# Patient Record
Sex: MALE | Race: BLACK OR AFRICAN AMERICAN | NOT HISPANIC OR LATINO | URBAN - METROPOLITAN AREA
[De-identification: names, ages, dates, MRNs, and addresses within clinical notes are randomized per-mention and may not be internally consistent; named-entity substitution may affect disease eponyms.]

---

## 2019-10-23 ENCOUNTER — INPATIENT (INPATIENT)
Facility: HOSPITAL | Age: 29
LOS: 1 days | Discharge: HOME | DRG: 203 | End: 2019-10-25
Attending: INTERNAL MEDICINE | Admitting: INTERNAL MEDICINE
Payer: MEDICAID

## 2019-10-23 VITALS
DIASTOLIC BLOOD PRESSURE: 72 MMHG | WEIGHT: 240.08 LBS | TEMPERATURE: 98 F | SYSTOLIC BLOOD PRESSURE: 118 MMHG | RESPIRATION RATE: 17 BRPM | OXYGEN SATURATION: 98 % | HEART RATE: 100 BPM | HEIGHT: 68 IN

## 2019-10-23 DIAGNOSIS — R55 SYNCOPE AND COLLAPSE: ICD-10-CM

## 2019-10-23 DIAGNOSIS — Z29.9 ENCOUNTER FOR PROPHYLACTIC MEASURES, UNSPECIFIED: ICD-10-CM

## 2019-10-23 DIAGNOSIS — J45.901 UNSPECIFIED ASTHMA WITH (ACUTE) EXACERBATION: ICD-10-CM

## 2019-10-23 LAB
ALBUMIN SERPL ELPH-MCNC: 4 G/DL — SIGNIFICANT CHANGE UP (ref 3.5–5)
ALP SERPL-CCNC: 80 U/L — SIGNIFICANT CHANGE UP (ref 40–120)
ALT FLD-CCNC: 53 U/L DA — SIGNIFICANT CHANGE UP (ref 10–60)
ANION GAP SERPL CALC-SCNC: 6 MMOL/L — SIGNIFICANT CHANGE UP (ref 5–17)
AST SERPL-CCNC: 25 U/L — SIGNIFICANT CHANGE UP (ref 10–40)
BASE EXCESS BLDV CALC-SCNC: -1.3 MMOL/L — SIGNIFICANT CHANGE UP (ref -2–2)
BASOPHILS # BLD AUTO: 0.06 K/UL — SIGNIFICANT CHANGE UP (ref 0–0.2)
BASOPHILS NFR BLD AUTO: 0.6 % — SIGNIFICANT CHANGE UP (ref 0–2)
BILIRUB SERPL-MCNC: 0.5 MG/DL — SIGNIFICANT CHANGE UP (ref 0.2–1.2)
BLOOD GAS COMMENTS, VENOUS: SIGNIFICANT CHANGE UP
BUN SERPL-MCNC: 7 MG/DL — SIGNIFICANT CHANGE UP (ref 7–18)
CALCIUM SERPL-MCNC: 8.5 MG/DL — SIGNIFICANT CHANGE UP (ref 8.4–10.5)
CHLORIDE SERPL-SCNC: 106 MMOL/L — SIGNIFICANT CHANGE UP (ref 96–108)
CK MB BLD-MCNC: 1.4 % — SIGNIFICANT CHANGE UP (ref 0–3.5)
CK MB CFR SERPL CALC: 4.5 NG/ML — HIGH (ref 0–3.6)
CK SERPL-CCNC: 320 U/L — HIGH (ref 35–232)
CO2 SERPL-SCNC: 24 MMOL/L — SIGNIFICANT CHANGE UP (ref 22–31)
CREAT SERPL-MCNC: 1.09 MG/DL — SIGNIFICANT CHANGE UP (ref 0.5–1.3)
EOSINOPHIL # BLD AUTO: 0.31 K/UL — SIGNIFICANT CHANGE UP (ref 0–0.5)
EOSINOPHIL NFR BLD AUTO: 3.3 % — SIGNIFICANT CHANGE UP (ref 0–6)
FLU A RESULT: SIGNIFICANT CHANGE UP
FLU A RESULT: SIGNIFICANT CHANGE UP
FLUAV AG NPH QL: SIGNIFICANT CHANGE UP
FLUBV AG NPH QL: SIGNIFICANT CHANGE UP
GLUCOSE SERPL-MCNC: 161 MG/DL — HIGH (ref 70–99)
HCO3 BLDV-SCNC: 24 MMOL/L — SIGNIFICANT CHANGE UP (ref 21–29)
HCT VFR BLD CALC: 43.1 % — SIGNIFICANT CHANGE UP (ref 39–50)
HGB BLD-MCNC: 14.3 G/DL — SIGNIFICANT CHANGE UP (ref 13–17)
HOROWITZ INDEX BLDV+IHG-RTO: 21 — SIGNIFICANT CHANGE UP
IMM GRANULOCYTES NFR BLD AUTO: 0.4 % — SIGNIFICANT CHANGE UP (ref 0–1.5)
LYMPHOCYTES # BLD AUTO: 1.33 K/UL — SIGNIFICANT CHANGE UP (ref 1–3.3)
LYMPHOCYTES # BLD AUTO: 14.1 % — SIGNIFICANT CHANGE UP (ref 13–44)
MCHC RBC-ENTMCNC: 30.3 PG — SIGNIFICANT CHANGE UP (ref 27–34)
MCHC RBC-ENTMCNC: 33.2 GM/DL — SIGNIFICANT CHANGE UP (ref 32–36)
MCV RBC AUTO: 91.3 FL — SIGNIFICANT CHANGE UP (ref 80–100)
MONOCYTES # BLD AUTO: 0.6 K/UL — SIGNIFICANT CHANGE UP (ref 0–0.9)
MONOCYTES NFR BLD AUTO: 6.4 % — SIGNIFICANT CHANGE UP (ref 2–14)
NEUTROPHILS # BLD AUTO: 7.1 K/UL — SIGNIFICANT CHANGE UP (ref 1.8–7.4)
NEUTROPHILS NFR BLD AUTO: 75.2 % — SIGNIFICANT CHANGE UP (ref 43–77)
NRBC # BLD: 0 /100 WBCS — SIGNIFICANT CHANGE UP (ref 0–0)
PCO2 BLDV: 45 MMHG — SIGNIFICANT CHANGE UP (ref 35–50)
PH BLDV: 7.35 — SIGNIFICANT CHANGE UP (ref 7.35–7.45)
PLATELET # BLD AUTO: 345 K/UL — SIGNIFICANT CHANGE UP (ref 150–400)
PO2 BLDV: 46 MMHG — HIGH (ref 25–45)
POTASSIUM SERPL-MCNC: 4.1 MMOL/L — SIGNIFICANT CHANGE UP (ref 3.5–5.3)
POTASSIUM SERPL-SCNC: 4.1 MMOL/L — SIGNIFICANT CHANGE UP (ref 3.5–5.3)
PROT SERPL-MCNC: 8.1 G/DL — SIGNIFICANT CHANGE UP (ref 6–8.3)
RBC # BLD: 4.72 M/UL — SIGNIFICANT CHANGE UP (ref 4.2–5.8)
RBC # FLD: 13.2 % — SIGNIFICANT CHANGE UP (ref 10.3–14.5)
RSV RESULT: SIGNIFICANT CHANGE UP
RSV RNA RESP QL NAA+PROBE: SIGNIFICANT CHANGE UP
SAO2 % BLDV: 80 % — SIGNIFICANT CHANGE UP (ref 67–88)
SODIUM SERPL-SCNC: 136 MMOL/L — SIGNIFICANT CHANGE UP (ref 135–145)
TROPONIN I SERPL-MCNC: <0.015 NG/ML — SIGNIFICANT CHANGE UP (ref 0–0.04)
WBC # BLD: 9.44 K/UL — SIGNIFICANT CHANGE UP (ref 3.8–10.5)
WBC # FLD AUTO: 9.44 K/UL — SIGNIFICANT CHANGE UP (ref 3.8–10.5)

## 2019-10-23 PROCEDURE — 99285 EMERGENCY DEPT VISIT HI MDM: CPT

## 2019-10-23 PROCEDURE — 71046 X-RAY EXAM CHEST 2 VIEWS: CPT | Mod: 26

## 2019-10-23 RX ORDER — IPRATROPIUM/ALBUTEROL SULFATE 18-103MCG
3 AEROSOL WITH ADAPTER (GRAM) INHALATION ONCE
Refills: 0 | Status: COMPLETED | OUTPATIENT
Start: 2019-10-23 | End: 2019-10-23

## 2019-10-23 RX ORDER — OXYMETAZOLINE HYDROCHLORIDE 0.5 MG/ML
1 SPRAY NASAL
Refills: 0 | Status: COMPLETED | OUTPATIENT
Start: 2019-10-23 | End: 2019-10-25

## 2019-10-23 RX ORDER — ALBUTEROL 90 UG/1
2.5 AEROSOL, METERED ORAL ONCE
Refills: 0 | Status: COMPLETED | OUTPATIENT
Start: 2019-10-23 | End: 2019-10-23

## 2019-10-23 RX ORDER — MONTELUKAST 4 MG/1
10 TABLET, CHEWABLE ORAL DAILY
Refills: 0 | Status: DISCONTINUED | OUTPATIENT
Start: 2019-10-23 | End: 2019-10-25

## 2019-10-23 RX ORDER — BUDESONIDE AND FORMOTEROL FUMARATE DIHYDRATE 160; 4.5 UG/1; UG/1
2 AEROSOL RESPIRATORY (INHALATION)
Refills: 0 | Status: DISCONTINUED | OUTPATIENT
Start: 2019-10-23 | End: 2019-10-25

## 2019-10-23 RX ORDER — LORATADINE 10 MG/1
10 TABLET ORAL DAILY
Refills: 0 | Status: DISCONTINUED | OUTPATIENT
Start: 2019-10-23 | End: 2019-10-25

## 2019-10-23 RX ORDER — IPRATROPIUM/ALBUTEROL SULFATE 18-103MCG
3 AEROSOL WITH ADAPTER (GRAM) INHALATION EVERY 6 HOURS
Refills: 0 | Status: DISCONTINUED | OUTPATIENT
Start: 2019-10-23 | End: 2019-10-25

## 2019-10-23 RX ADMIN — BUDESONIDE AND FORMOTEROL FUMARATE DIHYDRATE 2 PUFF(S): 160; 4.5 AEROSOL RESPIRATORY (INHALATION) at 23:23

## 2019-10-23 RX ADMIN — Medication 3 MILLILITER(S): at 08:32

## 2019-10-23 RX ADMIN — LORATADINE 10 MILLIGRAM(S): 10 TABLET ORAL at 11:18

## 2019-10-23 RX ADMIN — Medication 40 MILLIGRAM(S): at 21:44

## 2019-10-23 RX ADMIN — ALBUTEROL 2.5 MILLIGRAM(S): 90 AEROSOL, METERED ORAL at 12:41

## 2019-10-23 RX ADMIN — Medication 3 MILLILITER(S): at 14:50

## 2019-10-23 RX ADMIN — OXYMETAZOLINE HYDROCHLORIDE 1 SPRAY(S): 0.5 SPRAY NASAL at 19:27

## 2019-10-23 RX ADMIN — Medication 3 MILLILITER(S): at 20:25

## 2019-10-23 RX ADMIN — Medication 40 MILLIGRAM(S): at 08:32

## 2019-10-23 RX ADMIN — Medication 40 MILLIGRAM(S): at 13:53

## 2019-10-23 RX ADMIN — MONTELUKAST 10 MILLIGRAM(S): 4 TABLET, CHEWABLE ORAL at 11:18

## 2019-10-23 NOTE — H&P ADULT - NSHPPHYSICALEXAM_GEN_ALL_CORE
Vital Signs Last 24 Hrs  T(C): 36.9 (23 Oct 2019 11:18), Max: 36.9 (23 Oct 2019 11:18)  T(F): 98.4 (23 Oct 2019 11:18), Max: 98.4 (23 Oct 2019 11:18)  HR: 111 (23 Oct 2019 11:18) (100 - 111)  BP: 153/96 (23 Oct 2019 11:18) (118/72 - 153/96)  RR: 20 (23 Oct 2019 11:18) (16 - 20)  SpO2: 97% (23 Oct 2019 11:18) (97% - 98%)      PHYSICAL EXAM:  GENERAL: NAD  HEENT: Normocephalic;  conjunctivae and sclerae clear; moist mucous membranes;   Nasal congestion   NECK : supple  CHEST/LUNG: Clear to auscultation bilaterally with good air entry , wheeze present   HEART: S1 S2  regular; no murmurs, gallops or rubs  ABDOMEN: Soft, Nontender, Nondistended; Bowel sounds present  EXTREMITIES: no cyanosis; no edema; no calf tenderness  SKIN: warm and dry; no rash  NERVOUS SYSTEM:  Awake and alert; Oriented  to place, person and time ; no new deficits

## 2019-10-23 NOTE — H&P ADULT - HISTORY OF PRESENT ILLNESS
29 yr old male with hx of asthma poorly controlled presents to ed c/o worseing asthma with syncope pta. pt states poorly control many yrs been just using albuterol only. today went to work and felt dizzy, nausea and sub fever then passed out for sec.  no head trauma. no palpitations, no leg swelling, no vomiting.  didn't receive flu shot.  ems gave 2 combi and mag 2mg IV. no fam hx of arrhythmia, no cp, no headache, no visual changes, no incontinence, 29 yr old male with hx of asthma, poorly controlled presents to ed c/o worsening SOB from last 2 days, requiring use os Albuterol inhaler every 10 mins associated with nasal congestion, cough with yellow sputum production, wheeze, denies fever. He has h/o Asthma from last 5 years, doesnot follow up with PCP and uses Albuterol inhaler. He mentions that his Asthma symptoms were never controlled especially from last 1 year and acutely worsened in last 2 days.     HE was at work( works as a ) when he suddenly felt hot, lightheaded and fell but denies hitting head or any injury, regained consciousness in few seconds, felt back to baseline immediately. Denies palpitation, CP.

## 2019-10-23 NOTE — ED PROVIDER NOTE - PROGRESS NOTE DETAILS
[place]: improve but still having diffuse wheezing.  abc intact.  no resp distress  admit to med for asthma exacerbation will require nebs and asthma maintenancec

## 2019-10-23 NOTE — H&P ADULT - NSHPREVIEWOFSYSTEMS_GEN_ALL_CORE
REVIEW OF SYSTEMS:  CONSTITUTIONAL: No fever,   EYES: no acute visual disturbances  NECK: No pain or stiffness  RESPIRATORY: as above   CARDIOVASCULAR: No chest pain, no palpitations  GASTROINTESTINAL: No pain. No nausea or vomiting; No diarrhea   NEUROLOGICAL: No headache or numbness, no tremors  MUSCULOSKELETAL: No joint pain, no muscle pain  GENITOURINARY: no dysuria, no frequency, no hesitancy  PSYCHIATRY: no depression , no anxiety  ALL OTHER  ROS negative

## 2019-10-23 NOTE — H&P ADULT - PROBLEM SELECTOR PLAN 1
p/w SOb   - RR: 17, saturating 98% on RA   -FLU, RSV: neg   - ED coursE: Albuterol, Prednisone 40 x1   - Duo neb,   -Solu medrol 40 q8 , switch to Po tomorrow, if improving   - Patient will benefit from adding Symbicort to home med on discharge   -Claritin, Montelukast   - Afrin for 2 days

## 2019-10-23 NOTE — ED PROVIDER NOTE - OBJECTIVE STATEMENT
29 yr old male with hx of asthma poorly controlled presents to ed c/o worseing asthma with syncope pta. pt states poorly control many yrs been just using albuterol only. today went to work and felt dizzy, nausea and sub fever then passed out for sec.  no head trauma. no palpitations, no leg swelling, no vomiting.  didn't receive flu shot.  ems gave 2 combi and mag 2mg IV. no fam hx of arrhythmia, no cp, no headache, no visual changes, no incontinence,

## 2019-10-23 NOTE — ED PROVIDER NOTE - AXIS
Patient will come to [facility] office to : prescription.   Patient was advised of location and hours: Yes.   Patient was advised to bring photo ID: Yes.   Patient elects another party to  item: no    Pt will  script Thursday around 4:00  PT IS AWARE OF NEW 72 HOUR RULE.        Normal

## 2019-10-23 NOTE — H&P ADULT - ATTENDING COMMENTS
Patient seen and examined, History, PE , detail management d/w Dr Francisco-PGY 2.  Also initially d/w ER MD in detail  To check Peak flow periodically and prepare for early discharge, needs close out patient management.

## 2019-10-24 LAB
24R-OH-CALCIDIOL SERPL-MCNC: 4.4 NG/ML — LOW (ref 30–80)
ALBUMIN SERPL ELPH-MCNC: 3.7 G/DL — SIGNIFICANT CHANGE UP (ref 3.5–5)
ALP SERPL-CCNC: 84 U/L — SIGNIFICANT CHANGE UP (ref 40–120)
ALT FLD-CCNC: 53 U/L DA — SIGNIFICANT CHANGE UP (ref 10–60)
ANION GAP SERPL CALC-SCNC: 8 MMOL/L — SIGNIFICANT CHANGE UP (ref 5–17)
AST SERPL-CCNC: 17 U/L — SIGNIFICANT CHANGE UP (ref 10–40)
BASOPHILS # BLD AUTO: 0 K/UL — SIGNIFICANT CHANGE UP (ref 0–0.2)
BASOPHILS NFR BLD AUTO: 0 % — SIGNIFICANT CHANGE UP (ref 0–2)
BILIRUB SERPL-MCNC: 0.2 MG/DL — SIGNIFICANT CHANGE UP (ref 0.2–1.2)
BUN SERPL-MCNC: 14 MG/DL — SIGNIFICANT CHANGE UP (ref 7–18)
CALCIUM SERPL-MCNC: 8.9 MG/DL — SIGNIFICANT CHANGE UP (ref 8.4–10.5)
CHLORIDE SERPL-SCNC: 104 MMOL/L — SIGNIFICANT CHANGE UP (ref 96–108)
CHOLEST SERPL-MCNC: 181 MG/DL — SIGNIFICANT CHANGE UP (ref 10–199)
CO2 SERPL-SCNC: 27 MMOL/L — SIGNIFICANT CHANGE UP (ref 22–31)
CREAT SERPL-MCNC: 1 MG/DL — SIGNIFICANT CHANGE UP (ref 0.5–1.3)
EOSINOPHIL # BLD AUTO: 0 K/UL — SIGNIFICANT CHANGE UP (ref 0–0.5)
EOSINOPHIL NFR BLD AUTO: 0 % — SIGNIFICANT CHANGE UP (ref 0–6)
FOLATE SERPL-MCNC: 7.5 NG/ML — SIGNIFICANT CHANGE UP
GLUCOSE SERPL-MCNC: 127 MG/DL — HIGH (ref 70–99)
HBA1C BLD-MCNC: 5.9 % — HIGH (ref 4–5.6)
HCT VFR BLD CALC: 45.6 % — SIGNIFICANT CHANGE UP (ref 39–50)
HDLC SERPL-MCNC: 35 MG/DL — LOW
HGB BLD-MCNC: 15.1 G/DL — SIGNIFICANT CHANGE UP (ref 13–17)
LIPID PNL WITH DIRECT LDL SERPL: 131 MG/DL — SIGNIFICANT CHANGE UP
LYMPHOCYTES # BLD AUTO: 1.33 K/UL — SIGNIFICANT CHANGE UP (ref 1–3.3)
LYMPHOCYTES # BLD AUTO: 7 % — LOW (ref 13–44)
MAGNESIUM SERPL-MCNC: 2.8 MG/DL — HIGH (ref 1.6–2.6)
MCHC RBC-ENTMCNC: 30.2 PG — SIGNIFICANT CHANGE UP (ref 27–34)
MCHC RBC-ENTMCNC: 33.1 GM/DL — SIGNIFICANT CHANGE UP (ref 32–36)
MCV RBC AUTO: 91.2 FL — SIGNIFICANT CHANGE UP (ref 80–100)
MONOCYTES # BLD AUTO: 1.52 K/UL — HIGH (ref 0–0.9)
MONOCYTES NFR BLD AUTO: 8 % — SIGNIFICANT CHANGE UP (ref 2–14)
NEUTROPHILS # BLD AUTO: 16.17 K/UL — HIGH (ref 1.8–7.4)
NEUTROPHILS NFR BLD AUTO: 83 % — HIGH (ref 43–77)
PHOSPHATE SERPL-MCNC: 4.7 MG/DL — HIGH (ref 2.5–4.5)
PLATELET # BLD AUTO: 442 K/UL — HIGH (ref 150–400)
POTASSIUM SERPL-MCNC: 4.3 MMOL/L — SIGNIFICANT CHANGE UP (ref 3.5–5.3)
POTASSIUM SERPL-SCNC: 4.3 MMOL/L — SIGNIFICANT CHANGE UP (ref 3.5–5.3)
PROT SERPL-MCNC: 8.3 G/DL — SIGNIFICANT CHANGE UP (ref 6–8.3)
RBC # BLD: 5 M/UL — SIGNIFICANT CHANGE UP (ref 4.2–5.8)
RBC # FLD: 13.7 % — SIGNIFICANT CHANGE UP (ref 10.3–14.5)
SODIUM SERPL-SCNC: 139 MMOL/L — SIGNIFICANT CHANGE UP (ref 135–145)
TOTAL CHOLESTEROL/HDL RATIO MEASUREMENT: 5.2 RATIO — SIGNIFICANT CHANGE UP (ref 3.4–9.6)
TRIGL SERPL-MCNC: 74 MG/DL — SIGNIFICANT CHANGE UP (ref 10–149)
TSH SERPL-MCNC: 0.41 UU/ML — SIGNIFICANT CHANGE UP (ref 0.34–4.82)
VIT B12 SERPL-MCNC: 846 PG/ML — SIGNIFICANT CHANGE UP (ref 232–1245)
WBC # BLD: 19.02 K/UL — HIGH (ref 3.8–10.5)
WBC # FLD AUTO: 19.02 K/UL — HIGH (ref 3.8–10.5)

## 2019-10-24 PROCEDURE — 93306 TTE W/DOPPLER COMPLETE: CPT | Mod: 26

## 2019-10-24 RX ORDER — ALBUTEROL 90 UG/1
2 AEROSOL, METERED ORAL
Qty: 1 | Refills: 0
Start: 2019-10-24 | End: 2019-11-22

## 2019-10-24 RX ORDER — ERGOCALCIFEROL 1.25 MG/1
50000 CAPSULE ORAL
Refills: 0 | Status: DISCONTINUED | OUTPATIENT
Start: 2019-10-24 | End: 2019-10-25

## 2019-10-24 RX ORDER — LORATADINE 10 MG/1
1 TABLET ORAL
Qty: 0 | Refills: 0 | DISCHARGE
Start: 2019-10-24

## 2019-10-24 RX ORDER — ALBUTEROL 90 UG/1
0 AEROSOL, METERED ORAL
Qty: 0 | Refills: 0 | DISCHARGE

## 2019-10-24 RX ORDER — MONTELUKAST 4 MG/1
1 TABLET, CHEWABLE ORAL
Qty: 30 | Refills: 0
Start: 2019-10-24 | End: 2019-11-22

## 2019-10-24 RX ORDER — BUDESONIDE AND FORMOTEROL FUMARATE DIHYDRATE 160; 4.5 UG/1; UG/1
2 AEROSOL RESPIRATORY (INHALATION)
Qty: 1 | Refills: 0
Start: 2019-10-24 | End: 2019-11-22

## 2019-10-24 RX ADMIN — OXYMETAZOLINE HYDROCHLORIDE 1 SPRAY(S): 0.5 SPRAY NASAL at 06:37

## 2019-10-24 RX ADMIN — Medication 3 MILLILITER(S): at 08:45

## 2019-10-24 RX ADMIN — BUDESONIDE AND FORMOTEROL FUMARATE DIHYDRATE 2 PUFF(S): 160; 4.5 AEROSOL RESPIRATORY (INHALATION) at 21:17

## 2019-10-24 RX ADMIN — Medication 40 MILLIGRAM(S): at 06:37

## 2019-10-24 RX ADMIN — Medication 40 MILLIGRAM(S): at 17:45

## 2019-10-24 RX ADMIN — MONTELUKAST 10 MILLIGRAM(S): 4 TABLET, CHEWABLE ORAL at 11:24

## 2019-10-24 RX ADMIN — Medication 3 MILLILITER(S): at 15:29

## 2019-10-24 RX ADMIN — LORATADINE 10 MILLIGRAM(S): 10 TABLET ORAL at 11:25

## 2019-10-24 RX ADMIN — BUDESONIDE AND FORMOTEROL FUMARATE DIHYDRATE 2 PUFF(S): 160; 4.5 AEROSOL RESPIRATORY (INHALATION) at 12:49

## 2019-10-24 RX ADMIN — OXYMETAZOLINE HYDROCHLORIDE 1 SPRAY(S): 0.5 SPRAY NASAL at 17:45

## 2019-10-24 RX ADMIN — Medication 3 MILLILITER(S): at 20:18

## 2019-10-24 NOTE — CONSULT NOTE ADULT - SUBJECTIVE AND OBJECTIVE BOX
CHIEF COMPLAINT:Syncope    HPI:29 yr old male with hx of asthma, poorly controlled presents to ed c/o worsening SOB from last 2 days, requiring use of Albuterol inhaler every 10 mins associated with nasal congestion, cough with yellow sputum production, wheeze, denies fever. He has h/o Asthma from last 5 years, doesnot follow up with PCP and uses Albuterol inhaler. He mentions that his Asthma symptoms were never controlled especially from last 1 year and acutely worsened in last 2 days.     HE was at work( works as a ) when he suddenly felt hot, lightheaded and fell but denies hitting head or any injury, regained consciousness in few seconds, felt back to baseline immediately. Denies palpitation, CP. No prior Hx syncope    PAST MEDICAL & SURGICAL HISTORY:  Asthma  No significant past surgical history      MEDICATIONS  (STANDING):  albuterol/ipratropium for Nebulization 3 milliLiter(s) Nebulizer every 6 hours  budesonide  80 MICROgram(s)/formoterol 4.5 MICROgram(s) Inhaler 2 Puff(s) Inhalation two times a day  loratadine 10 milliGRAM(s) Oral daily  methylPREDNISolone sodium succinate Injectable 40 milliGRAM(s) IV Push once  montelukast 10 milliGRAM(s) Oral daily  oxymetazoline 0.05% Nasal Spray 1 Spray(s) Both Nostrils two times a day        FAMILY HISTORY:  No pertinent family history in first degree relatives  No family history of premature coronary artery disease or sudden cardiac death    SOCIAL HISTORY:  Smoking-Non Smoker  Alcohol-Denies  Ilicit Drug use-Denies    REVIEW OF SYSTEMS:  Constitutional: [ ] fever, [ ]weight loss, [ ]fatigue Activity [ ] Bedbound,[x ] Ambulates [x ] Unassisted[ ] Cane/Walker [ ] Assistence.  Eyes: [ ] visual changes  Respiratory: [x ]shortness of breath;  [ ] cough, [ ]wheezing, [ ]chills, [ ]hemoptysis  Cardiovascular: [ ] chest pain, [ ]palpitations, [ ]dizziness,  [ ]leg swelling[ ]orthopnea [ ]PND  Gastrointestinal: [ ] abdominal pain, [ ]nausea, [ ]vomiting,  [ ]diarrhea,[ ]constipation  Genitourinary: [ ] dysuria, [ ] hematuria  Neurologic: [ ] headaches [ ] tremors[ ] weakness  Skin: [ ] itching, [ ]burning, [ ] rashes  Endocrine: [ ] heat or cold intolerance  Musculoskeletal: [ ] joint pain or swelling; [ ] muscle, back, or extremity pain  Psychiatric: [ ] depression, [ ]anxiety, [ ]mood swings, or [ ]difficulty sleeping  Hematologic: [ ] easy bruising, [ ] bleeding gums       [ x] All others negative	  [ ] Unable to obtain    Vital Signs Last 24 Hrs  T(C): 36.7 (24 Oct 2019 07:40), Max: 36.9 (23 Oct 2019 11:18)  T(F): 98.1 (24 Oct 2019 07:40), Max: 98.4 (23 Oct 2019 11:18)  HR: 97 (24 Oct 2019 07:40) (96 - 111)  BP: 136/68 (24 Oct 2019 07:40) (132/77 - 153/96  RR: 16 (24 Oct 2019 07:40) (16 - 20)  SpO2: 93% (24 Oct 2019 07:40) (93% - 97%)  I&O's Summary      PHYSICAL EXAM:  General: No acute distress BMI-36.5  HEENT: EOMI, PERRL[ ] Icteric  Neck: Supple, No JVD  Lungs: Equal air entry bilaterally; [ ] Rales [ ] Rhonchi [ ] Wheezing  Heart: Regular rate and rhythm;[x ] Murmurs-  2 /6 [x ] Systolic [ ] Diastolic [ ] Radiation,No rubs, or gallops  Abdomen: Nontender, bowel sounds present  Extremities: No clubbing, cyanosis, or edema[ ] Calf tenderness  Nervous system:  Alert & Oriented X3, no focal deficits  Psychiatric: Normal affect  Skin: No rashes or lesions      LABS:  10-24    139  |  104  |  14  ----------------------------<  127<H>  4.3   |  27  |  1.00    Ca    8.9      24 Oct 2019 06:43  Phos  4.7     10-24  Mg     2.8     10-24    TPro  8.3  /  Alb  3.7  /  TBili  0.2  /  DBili  x   /  AST  17  /  ALT  53  /  AlkPhos  84  10-24    Creatinine Trend: 1.00<--, 1.09<--                        15.1   19.02 )-----------( 442      ( 24 Oct 2019 06:43 )             45.6         Lipid Panel: Cholesterol, Serum 181  Direct   HDL Cholesterol, Serum 35  Triglycerides, Serum 74    Cardiac Enzymes: CARDIAC MARKERS ( 23 Oct 2019 14:26 )  <0.015 ng/mL / x     / 320 U/L / x     / 4.5 ng/mL            RADIOLOGY:XR CHEST PA LAT 2V                   Impression:-Clear lungs.      ECG [my interpretation]:Sinus rhythm no ST T wave abnormalities    TELEMETRY:Sinus rhythm no arrhythmias

## 2019-10-24 NOTE — PROGRESS NOTE ADULT - PROBLEM SELECTOR PLAN 1
Pt feels much better now  symptoms have improved  peak flow spirometry was done, maximum was 170   f/u for medications as he is not able to afford

## 2019-10-24 NOTE — CONSULT NOTE ADULT - ATTENDING COMMENTS
Patient was seen and examined,interim events noted,labs and radiology studies reviewed.  Lewis Burnette MD,FACC.  [address] [place] [address].  [id number].  [phone]

## 2019-10-24 NOTE — PROGRESS NOTE ADULT - SUBJECTIVE AND OBJECTIVE BOX
PGY 1 Note discussed with supervising resident and primary attending    Patient is a 29y old  Male who presents with a chief complaint of SOB, syncope (24 Oct 2019 10:58)      INTERVAL HPI/OVERNIGHT EVENTS: offers no new complaints; current symptoms resolving    MEDICATIONS  (STANDING):  albuterol/ipratropium for Nebulization 3 milliLiter(s) Nebulizer every 6 hours  budesonide  80 MICROgram(s)/formoterol 4.5 MICROgram(s) Inhaler 2 Puff(s) Inhalation two times a day  loratadine 10 milliGRAM(s) Oral daily  methylPREDNISolone sodium succinate Injectable 40 milliGRAM(s) IV Push once  montelukast 10 milliGRAM(s) Oral daily  oxymetazoline 0.05% Nasal Spray 1 Spray(s) Both Nostrils two times a day    MEDICATIONS  (PRN):      __________________________________________________  REVIEW OF SYSTEMS:    CONSTITUTIONAL: No fever,   EYES: no acute visual disturbances  NECK: No pain or stiffness  RESPIRATORY: No cough; No shortness of breath  CARDIOVASCULAR: No chest pain, no palpitations  GASTROINTESTINAL: No pain. No nausea or vomiting; No diarrhea   NEUROLOGICAL: No headache or numbness, no tremors  MUSCULOSKELETAL: No joint pain, no muscle pain  GENITOURINARY: no dysuria, no frequency, no hesitancy  PSYCHIATRY: no depression , no anxiety  ALL OTHER  ROS negative        Vital Signs Last 24 Hrs  T(C): 36.7 (24 Oct 2019 11:19), Max: 36.9 (23 Oct 2019 12:24)  T(F): 98.1 (24 Oct 2019 11:19), Max: 98.4 (23 Oct 2019 12:24)  HR: 99 (24 Oct 2019 11:19) (96 - 110)  BP: 149/67 (24 Oct 2019 11:19) (132/77 - 150/68)  BP(mean): --  RR: 16 (24 Oct 2019 11:19) (16 - 18)  SpO2: 94% (24 Oct 2019 11:19) (93% - 97%)    ________________________________________________  PHYSICAL EXAM:  GENERAL: NAD  HEENT: Normocephalic;  conjunctivae and sclerae clear; moist mucous membranes;   NECK : supple  CHEST/LUNG: Clear to auscultation bilaterally with good air entry   HEART: S1 S2  regular; no murmurs, gallops or rubs  ABDOMEN: Soft, Nontender, Nondistended; Bowel sounds present  EXTREMITIES: no cyanosis; no edema; no calf tenderness  SKIN: warm and dry; no rash  NERVOUS SYSTEM:  Awake and alert; Oriented  to place, person and time ; no new deficits    _________________________________________________  LABS:                        15.1   19.02 )-----------( 442      ( 24 Oct 2019 06:43 )             45.6     10-24    139  |  104  |  14  ----------------------------<  127<H>  4.3   |  27  |  1.00    Ca    8.9      24 Oct 2019 06:43  Phos  4.7     10-24  Mg     2.8     10-24    TPro  8.3  /  Alb  3.7  /  TBili  0.2  /  DBili  x   /  AST  17  /  ALT  53  /  AlkPhos  84  10-24        CAPILLARY BLOOD GLUCOSE            RADIOLOGY & ADDITIONAL TESTS:    Imaging Personally Reviewed:  YES/NO    Consultant(s) Notes Reviewed:   YES/ No    Care Discussed with Consultants :     Plan of care was discussed with patient and /or primary care giver; all questions and concerns were addressed and care was aligned with patient's wishes.

## 2019-10-24 NOTE — PROGRESS NOTE ADULT - ASSESSMENT
30 yo M with PMH of Asthma, presented with SOB and Syncope       Admitted for asthma exacerbation 28 yo M with PMH of Asthma, presented with SOB and Syncope       Admitted for asthma exacerbation   Pt is medically stable to be discharged

## 2019-10-25 VITALS
HEART RATE: 96 BPM | RESPIRATION RATE: 16 BRPM | SYSTOLIC BLOOD PRESSURE: 152 MMHG | OXYGEN SATURATION: 92 % | DIASTOLIC BLOOD PRESSURE: 82 MMHG | TEMPERATURE: 98 F

## 2019-10-25 LAB
ANION GAP SERPL CALC-SCNC: 5 MMOL/L — SIGNIFICANT CHANGE UP (ref 5–17)
BUN SERPL-MCNC: 16 MG/DL — SIGNIFICANT CHANGE UP (ref 7–18)
CALCIUM SERPL-MCNC: 8.4 MG/DL — SIGNIFICANT CHANGE UP (ref 8.4–10.5)
CHLORIDE SERPL-SCNC: 106 MMOL/L — SIGNIFICANT CHANGE UP (ref 96–108)
CK SERPL-CCNC: 112 U/L — SIGNIFICANT CHANGE UP (ref 35–232)
CO2 SERPL-SCNC: 25 MMOL/L — SIGNIFICANT CHANGE UP (ref 22–31)
CREAT SERPL-MCNC: 0.88 MG/DL — SIGNIFICANT CHANGE UP (ref 0.5–1.3)
GLUCOSE SERPL-MCNC: 115 MG/DL — HIGH (ref 70–99)
HCT VFR BLD CALC: 44.7 % — SIGNIFICANT CHANGE UP (ref 39–50)
HGB BLD-MCNC: 14.7 G/DL — SIGNIFICANT CHANGE UP (ref 13–17)
MCHC RBC-ENTMCNC: 30 PG — SIGNIFICANT CHANGE UP (ref 27–34)
MCHC RBC-ENTMCNC: 32.9 GM/DL — SIGNIFICANT CHANGE UP (ref 32–36)
MCV RBC AUTO: 91.2 FL — SIGNIFICANT CHANGE UP (ref 80–100)
NRBC # BLD: 0 /100 WBCS — SIGNIFICANT CHANGE UP (ref 0–0)
PLATELET # BLD AUTO: 429 K/UL — HIGH (ref 150–400)
POTASSIUM SERPL-MCNC: 4.2 MMOL/L — SIGNIFICANT CHANGE UP (ref 3.5–5.3)
POTASSIUM SERPL-SCNC: 4.2 MMOL/L — SIGNIFICANT CHANGE UP (ref 3.5–5.3)
RBC # BLD: 4.9 M/UL — SIGNIFICANT CHANGE UP (ref 4.2–5.8)
RBC # FLD: 13.6 % — SIGNIFICANT CHANGE UP (ref 10.3–14.5)
SODIUM SERPL-SCNC: 136 MMOL/L — SIGNIFICANT CHANGE UP (ref 135–145)
WBC # BLD: 20.12 K/UL — HIGH (ref 3.8–10.5)
WBC # FLD AUTO: 20.12 K/UL — HIGH (ref 3.8–10.5)

## 2019-10-25 PROCEDURE — 99222 1ST HOSP IP/OBS MODERATE 55: CPT

## 2019-10-25 RX ORDER — INFLUENZA VIRUS VACCINE 15; 15; 15; 15 UG/.5ML; UG/.5ML; UG/.5ML; UG/.5ML
0.5 SUSPENSION INTRAMUSCULAR ONCE
Refills: 0 | Status: COMPLETED | OUTPATIENT
Start: 2019-10-25 | End: 2019-10-25

## 2019-10-25 RX ADMIN — OXYMETAZOLINE HYDROCHLORIDE 1 SPRAY(S): 0.5 SPRAY NASAL at 05:45

## 2019-10-25 RX ADMIN — BUDESONIDE AND FORMOTEROL FUMARATE DIHYDRATE 2 PUFF(S): 160; 4.5 AEROSOL RESPIRATORY (INHALATION) at 10:55

## 2019-10-25 RX ADMIN — ERGOCALCIFEROL 50000 UNIT(S): 1.25 CAPSULE ORAL at 11:15

## 2019-10-25 RX ADMIN — Medication 3 MILLILITER(S): at 08:53

## 2019-10-25 RX ADMIN — MONTELUKAST 10 MILLIGRAM(S): 4 TABLET, CHEWABLE ORAL at 11:15

## 2019-10-25 RX ADMIN — Medication 40 MILLIGRAM(S): at 05:45

## 2019-10-25 RX ADMIN — LORATADINE 10 MILLIGRAM(S): 10 TABLET ORAL at 11:15

## 2019-10-25 NOTE — DISCHARGE NOTE NURSING/CASE MANAGEMENT/SOCIAL WORK - PATIENT PORTAL LINK FT
You can access the FollowMyHealth Patient Portal offered by [facility] by registering at the following website: [email or web address]. By joining [facility]’s FollowMyHealth portal, you will also be able to view your health information using other applications (apps) compatible with our system.

## 2019-10-25 NOTE — DISCHARGE NOTE PROVIDER - CARE PROVIDER_API CALL
Rc Salas)  Critical Care Medicine; Internal Medicine; Pulmonary Disease  [address] [place] [address]  Phone: [phone]  Fax: [phone]  Follow Up Time:

## 2019-10-25 NOTE — DISCHARGE NOTE PROVIDER - NSDCCPCAREPLAN_GEN_ALL_CORE_FT
PRINCIPAL DISCHARGE DIAGNOSIS  Diagnosis: Asthma exacerbation attacks  Assessment and Plan of Treatment: Continue with your nebulizers as intrusted by your primary care physician. Avoid triggers for asthma. If you're allergic to dust mites, pollens or molds, they can make your asthma symptoms get worse. Cold air, exercise, fumes from chemicals or perfume, tobacco or wood smoke, and weather changes can also make asthma symptoms worse. So can common colds and sinus infections. Vaccinate with influenza vaccine yearly. Follow up with primary care physician one week after discharge.  You were given flu vaccine. Please follow up with Dr brooks as outpatinet in [facility]25 clinic. You are adviced to remove the dog as it might trigger your asthma attacks.      SECONDARY DISCHARGE DIAGNOSES  Diagnosis: Syncope  Assessment and Plan of Treatment: You had syncopal attack. Cardiology workup was done and it was negative. Dr Burnette was consulted. Your symptoms are due to vasovagal as you were having shortness of breath. Please follow up with primary care doctor in a week.

## 2019-10-25 NOTE — DISCHARGE NOTE PROVIDER - HOSPITAL COURSE
29 yr old male with hx of asthma, poorly controlled presents to ed c/o worsening SOB from last 2 days, requiring use os Albuterol inhaler every 10 mins associated with nasal congestion, cough with yellow sputum production, wheeze, denies fever. He has h/o Asthma from last 5 years, doesnot follow up with PCP and uses Albuterol inhaler. He mentions that his Asthma symptoms were never controlled especially from last 1 year and acutely worsened in last 2 days.     He was admitted for asthma exacerbation and syncope. he was treated with solumedrol and symbicort and duonebs. He was found to have no insurance.  was consulted and [facility] were [facility]. Dr Bass was also consulted. He is adviced to follow up with Dr Bass as out patient. Cardiac workup was negative. Cardiology Dr danielle was consulted and     cardiac causes of syncope were ruled out.     Given patient's improved clinical status and current hemodynamic stability, decision was made to discharge the patient.    Patient is stable for discharge per attending and is advised to follow up with PCP as outpatient    Please refer to patient's complete medical chart with documents for a full hospital course, for this is only a brief summary.

## 2019-10-25 NOTE — CONSULT NOTE ADULT - ASSESSMENT
1) Acute asthma exacerbation likely from Viral URI  2) Syncope secondary to above with vagal episode  3) Moderate persistent asthma , poorly controlled at baseline    Clinically stable, no accessory muscle use or increased work of breathing  Asthma education done including proper inhaler technique and compliance with controller medications - Symbicort, montelukast. He currently has no insurance and is in the process of joining a union, Advised to follow up as soon as his insurance is obtained so there is no gap in his controller medications as his insurance may cover a different inhaler. He should keep his dog out of his bedroom and consider a Hepa air filter at night.  Outpatient follow up and PFTs to evaluation and discussion of asthma action plan.  He should receive his flu shot prior to discharge if he has not already obtained it.  Stable for dc from a pulmonary standpoint
28 yo M with PMH of Asthma, presented with SOB and Syncope       Problem/Plan - 1:  ·  Problem: Syncope.  Plan: - Non exertional  -Vitals: stable no orthostatics  -EKG: NSR   -Likely vasovagal  TTE-if Normal LV Function-cardiac status stable for discharge        Problem/Plan - 1:  ·  Problem: Asthma exacerbation attacks.  Plan: p/w SOb   - RR: 17, saturating 98% on RA   -FLU, RSV: neg   - ED coursE: Albuterol, Prednisone 40 x1   - Duo neb,

## 2019-10-25 NOTE — CONSULT NOTE ADULT - SUBJECTIVE AND OBJECTIVE BOX
Source: patient, chart    Reason for Consultation: acute asthma exacerbation    Chief Complaint: SOB x 1 week    HPI:  29 yr old male with hx of asthma, poorly controlled presents to ed c/o worsening SOB from last 2 days, requiring use os Albuterol inhaler every 10 mins associated with nasal congestion, cough with yellow sputum production, wheeze, denies fever. He has h/o Asthma from last 5 years, doesnot follow up with PCP and uses Albuterol inhaler. He mentions that his Asthma symptoms were never controlled especially from last 1 year and acutely worsened in last 2 days.     HE was at work( works as a ) when he suddenly felt hot, lightheaded and fell but denies hitting head or any injury, regained consciousness in few seconds, felt back to baseline immediately. Denies palpitation, CP. (23 Oct 2019 09:23)    The patient's asthma prior to his illness has not been well controlled. He uses his albuterol daily and has frequent nocturnal symptoms. He adamantly denies using any cocaine, tobacco, or vaping products. He has a dog at home. He has no recent ED, urgent care visits, or hospitalizations in the last few years. He does not see a doctor regularly.    MEDICATIONS  (STANDING):  albuterol/ipratropium for Nebulization 3 milliLiter(s) Nebulizer every 6 hours  budesonide  80 MICROgram(s)/formoterol 4.5 MICROgram(s) Inhaler 2 Puff(s) Inhalation two times a day  ergocalciferol 25036 Unit(s) Oral <User Schedule>  loratadine 10 milliGRAM(s) Oral daily  montelukast 10 milliGRAM(s) Oral daily  predniSONE   Tablet 40 milliGRAM(s) Oral daily    MEDICATIONS  (PRN):    Allergies    No Known Allergies    Intolerances    PAST MEDICAL & SURGICAL HISTORY:  Asthma  No significant past surgical history    FAMILY HISTORY:  No pertinent family history in first degree relatives    SOCIAL HISTORY  Smoking History: Denies  Alcohol: Denies  Drugs: Denies  [place]upation[place]  [place]    T(C): 36.6 (10-25-19 @ 07:55), Max: 37.2 (10-24-19 @ 23:51)  HR: 87 (10-25-19 @ 09:17) (87 - 99)  BP: 128/68 (10-25-19 @ 07:55) (122/80 - 149/67)  RR: 16 (10-25-19 @ 07:55) (16 - 18)  SpO2: 96% (10-25-19 @ 09:17) (94% - 96%)    LABS:                        14.7   20.12 )-----------( 429      ( 25 Oct 2019 07:07 )             44.7     10-25    136  |  106  |  16  ----------------------------<  115<H>  4.2   |  25  |  0.88    Ca    8.4      25 Oct 2019 07:07  Phos  4.7     10-24  Mg     2.8     10-24    TPro  8.3  /  Alb  3.7  /  TBili  0.2  /  DBili  x   /  AST  17  /  ALT  53  /  AlkPhos  84  10-24    CARDIAC MARKERS ( 25 Oct 2019 07:07 )  x     / x     / 112 U/L / x     / x      CARDIAC MARKERS ( 23 Oct 2019 14:26 )  <0.015 ng/mL / x     / 320 U/L / x     / 4.5 ng/mL    Microbiology    RADIOLOGY & ADDITIONAL STUDIES: (My Reading)    CXR- portable film. No infiltrates Source: patient, chart    Reason for Consultation: acute asthma exacerbation    Chief Complaint: SOB x 1 week    HPI:  29 yr old male with hx of asthma, poorly controlled presents to ed c/o worsening SOB from last 2 days, requiring use os Albuterol inhaler every 10 mins associated with nasal congestion, cough with yellow sputum production, wheeze, denies fever. He has h/o Asthma from last 5 years, doesnot follow up with PCP and uses Albuterol inhaler. He mentions that his Asthma symptoms were never controlled especially from last 1 year and acutely worsened in last 2 days.     HE was at work( works as a ) when he suddenly felt hot, lightheaded and fell but denies hitting head or any injury, regained consciousness in few seconds, felt back to baseline immediately. Denies palpitation, CP. (23 Oct 2019 09:23)    The patient's asthma prior to his illness has not been well controlled. He uses his albuterol daily and has frequent nocturnal symptoms. He adamantly denies using any cocaine, tobacco, or vaping products. He has a dog at home. He has no recent ED, urgent care visits, or hospitalizations in the last few years. He does not see a doctor regularly.    MEDICATIONS  (STANDING):  albuterol/ipratropium for Nebulization 3 milliLiter(s) Nebulizer every 6 hours  budesonide  80 MICROgram(s)/formoterol 4.5 MICROgram(s) Inhaler 2 Puff(s) Inhalation two times a day  ergocalciferol 02579 Unit(s) Oral <User Schedule>  loratadine 10 milliGRAM(s) Oral daily  montelukast 10 milliGRAM(s) Oral daily  predniSONE   Tablet 40 milliGRAM(s) Oral daily    MEDICATIONS  (PRN):    Allergies    No Known Allergies    Intolerances    PAST MEDICAL & SURGICAL HISTORY:  Asthma  No significant past surgical history    FAMILY HISTORY:  No pertinent family history in first degree relatives    SOCIAL HISTORY  Smoking History: Denies  Alcohol: Denies  Drugs: Denies  Occupation:  [place]    T(C): 36.6 (10-25-19 @ 07:55), Max: 37.2 (10-24-19 @ 23:51)  HR: 87 (10-25-19 @ 09:17) (87 - 99)  BP: 128/68 (10-25-19 @ 07:55) (122/80 - 149/67)  RR: 16 (10-25-19 @ 07:55) (16 - 18)  SpO2: 96% (10-25-19 @ 09:17) (94% - 96%)    LABS:                        14.7   20.12 )-----------( 429      ( 25 Oct 2019 07:07 )             44.7     10-25    136  |  106  |  16  ----------------------------<  115<H>  4.2   |  25  |  0.88    Ca    8.4      25 Oct 2019 07:07  Phos  4.7     10-24  Mg     2.8     10-24    TPro  8.3  /  Alb  3.7  /  TBili  0.2  /  DBili  x   /  AST  17  /  ALT  53  /  AlkPhos  84  10-24    CARDIAC MARKERS ( 25 Oct 2019 07:07 )  x     / x     / 112 U/L / x     / x      CARDIAC MARKERS ( 23 Oct 2019 14:26 )  <0.015 ng/mL / x     / 320 U/L / x     / 4.5 ng/mL    Microbiology    RADIOLOGY & ADDITIONAL STUDIES: (My Reading)    CXR- PA/LAT. No infiltrates

## 2019-10-31 PROCEDURE — 36415 COLL VENOUS BLD VENIPUNCTURE: CPT

## 2019-10-31 PROCEDURE — 82746 ASSAY OF FOLIC ACID SERUM: CPT

## 2019-10-31 PROCEDURE — 84443 ASSAY THYROID STIM HORMONE: CPT

## 2019-10-31 PROCEDURE — 87631 RESP VIRUS 3-5 TARGETS: CPT

## 2019-10-31 PROCEDURE — 85027 COMPLETE CBC AUTOMATED: CPT

## 2019-10-31 PROCEDURE — 80053 COMPREHEN METABOLIC PANEL: CPT

## 2019-10-31 PROCEDURE — 82607 VITAMIN B-12: CPT

## 2019-10-31 PROCEDURE — 84100 ASSAY OF PHOSPHORUS: CPT

## 2019-10-31 PROCEDURE — 82306 VITAMIN D 25 HYDROXY: CPT

## 2019-10-31 PROCEDURE — 83036 HEMOGLOBIN GLYCOSYLATED A1C: CPT

## 2019-10-31 PROCEDURE — 80048 BASIC METABOLIC PNL TOTAL CA: CPT

## 2019-10-31 PROCEDURE — 82550 ASSAY OF CK (CPK): CPT

## 2019-10-31 PROCEDURE — 84484 ASSAY OF TROPONIN QUANT: CPT

## 2019-10-31 PROCEDURE — 71046 X-RAY EXAM CHEST 2 VIEWS: CPT

## 2019-10-31 PROCEDURE — 93306 TTE W/DOPPLER COMPLETE: CPT

## 2019-10-31 PROCEDURE — 99285 EMERGENCY DEPT VISIT HI MDM: CPT | Mod: 25

## 2019-10-31 PROCEDURE — 80061 LIPID PANEL: CPT

## 2019-10-31 PROCEDURE — 82803 BLOOD GASES ANY COMBINATION: CPT

## 2019-10-31 PROCEDURE — 93005 ELECTROCARDIOGRAM TRACING: CPT

## 2019-10-31 PROCEDURE — 83735 ASSAY OF MAGNESIUM: CPT

## 2019-10-31 PROCEDURE — 94640 AIRWAY INHALATION TREATMENT: CPT

## 2019-10-31 PROCEDURE — 82553 CREATINE MB FRACTION: CPT

## 2021-06-23 NOTE — H&P ADULT - PROBLEM SELECTOR PLAN 2
- p/w LOC   -Vitals: stable   -EKG: NSR   - most likely vasovagal   Will monitor on Tele for today to rule out cardiac arrhthymias  -Will hold on any further work up for now 5 yrs ago . Patient does not recall the location

## 2023-06-23 NOTE — ED ADULT NURSE NOTE - CAS EDN DISCHARGE ASSESSMENT
Abdomen , soft, nontender, nondistended , no guarding or rigidity , no masses palpable , normal bowel sounds , Liver and Spleen,  no hepatosplenomegaly , liver nontender Alert and oriented to person, place and time